# Patient Record
Sex: FEMALE | Race: BLACK OR AFRICAN AMERICAN | URBAN - METROPOLITAN AREA
[De-identification: names, ages, dates, MRNs, and addresses within clinical notes are randomized per-mention and may not be internally consistent; named-entity substitution may affect disease eponyms.]

---

## 2018-04-06 ENCOUNTER — OP HISTORICAL/CONVERTED ENCOUNTER (OUTPATIENT)
Dept: OTHER | Age: 83
End: 2018-04-06

## 2022-11-08 ENCOUNTER — APPOINTMENT (OUTPATIENT)
Dept: CT IMAGING | Age: 87
End: 2022-11-08
Attending: STUDENT IN AN ORGANIZED HEALTH CARE EDUCATION/TRAINING PROGRAM
Payer: MEDICARE

## 2022-11-08 ENCOUNTER — HOSPITAL ENCOUNTER (EMERGENCY)
Age: 87
Discharge: HOME | End: 2022-11-08
Attending: STUDENT IN AN ORGANIZED HEALTH CARE EDUCATION/TRAINING PROGRAM
Payer: MEDICARE

## 2022-11-08 VITALS
HEART RATE: 61 BPM | BODY MASS INDEX: 27.82 KG/M2 | OXYGEN SATURATION: 99 % | DIASTOLIC BLOOD PRESSURE: 65 MMHG | SYSTOLIC BLOOD PRESSURE: 147 MMHG | HEIGHT: 65 IN | TEMPERATURE: 98 F | WEIGHT: 167 LBS | RESPIRATION RATE: 16 BRPM

## 2022-11-08 DIAGNOSIS — S01.81XA FACIAL LACERATION, INITIAL ENCOUNTER: ICD-10-CM
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
## 2022-11-08 PROCEDURE — 74011250636 HC RX REV CODE- 250/636: Performed by: STUDENT IN AN ORGANIZED HEALTH CARE EDUCATION/TRAINING PROGRAM

## 2022-11-08 PROCEDURE — 70450 CT HEAD/BRAIN W/O DYE: CPT

## 2022-11-08 PROCEDURE — 75810000293 HC SIMP/SUPERF WND  RPR

## 2022-11-08 PROCEDURE — 99284 EMERGENCY DEPT VISIT MOD MDM: CPT

## 2022-11-08 PROCEDURE — 90714 TD VACC NO PRESV 7 YRS+ IM: CPT | Performed by: STUDENT IN AN ORGANIZED HEALTH CARE EDUCATION/TRAINING PROGRAM

## 2022-11-08 PROCEDURE — 70486 CT MAXILLOFACIAL W/O DYE: CPT

## 2022-11-08 RX ORDER — LIDOCAINE HYDROCHLORIDE 10 MG/ML
5 INJECTION INFILTRATION; PERINEURAL ONCE
Status: DISCONTINUED | OUTPATIENT
Start: 2022-11-08 | End: 2022-11-08 | Stop reason: HOSPADM

## 2022-11-08 RX ORDER — CEPHALEXIN 500 MG/1
500 CAPSULE ORAL 4 TIMES DAILY
Qty: 28 CAPSULE | Refills: 0 | Status: SHIPPED | OUTPATIENT
Start: 2022-11-08 | End: 2022-11-15

## 2022-11-08 RX ADMIN — TETANUS AND DIPHTHERIA TOXOIDS ADSORBED 0.5 ML: 2; 2 INJECTION INTRAMUSCULAR at 05:00

## 2022-11-08 NOTE — ED PROVIDER NOTES
[facility]  EMERGENCY DEPARTMENT ENCOUNTER NOTE        Date: 11/8/2022  Patient Name: John Bender      History of Presenting Illness     Chief Complaint   Patient presents with    Fall       History Provided By: Patient    HPI: John Bender, 80 y.o. female with PMH of DM, HTN, HLD, and unsteadiness who presents to the ED after having mechanical fall. Patient reports that she used the bathroom and as she is trying to transition to bed she had an episode of unsteadiness which is usual for her and fell and hit her head on the bed. This resulted in a laceration to the left lower side of the face. Unknown tetanus status. She does not had any loss of consciousness. No headache, changes to vision, hearing, speech, numbness, weakness or than usual.  She is denying preceding chest pain, shortness of breath, lightheadedness or dizziness. No nausea, vomiting, recent illnesses, or any changes in her bowel, dietary, urinary habits. No recent change in her medications. There are no other complaints, changes, or physical findings at this time. PCP: None        Past History     Past Medical History:  Diabetes  Hypertension  Hyperlipidemia  Unsteadiness    Past Surgical History:  No past surgical history on file. Family History:  No family history on file. Social History: Allergies:  No Known Allergies      Review of Systems     Review of Systems    A 10 point review of system was performed and was negative except as noted above in HPI    Physical Exam     Physical Exam  Vitals and nursing note reviewed. Constitutional:       General: She is not in acute distress. Appearance: She is well-developed. She is not diaphoretic. HENT:      Head: Normocephalic and atraumatic. Comments: Laceration to the left side of the lower face just right below the lip x2  Lac # 1 = 6 cm  Lac # 2 = 2 cm  Eyes:      Extraocular Movements: Extraocular movements intact. Conjunctiva/sclera: Conjunctivae normal.   Cardiovascular:      Rate and Rhythm: Normal rate and regular rhythm. Heart sounds: Normal heart sounds. Pulmonary:      Effort: Pulmonary effort is normal.      Breath sounds: Normal breath sounds. Abdominal:      Palpations: Abdomen is soft. Tenderness: There is no abdominal tenderness. Musculoskeletal:      Cervical back: Neck supple. No tenderness. Right lower leg: No tenderness. No edema. Left lower leg: No tenderness. No edema. Lymphadenopathy:      Cervical: No cervical adenopathy. Neurological:      Mental Status: She is alert and oriented to person, place, and time. Cranial Nerves: No cranial nerve deficit. Sensory: No sensory deficit. Motor: No weakness. Lab and Diagnostic Study Results     Labs -   No results found for this or any previous visit (from the past 12 hour(s)). Radiologic Studies -   [unfilled]  CT Results  (Last 48 hours)                 11/08/22 0442  CT HEAD WO CONT Final result    Impression:  No acute intracranial abnormality               Narrative:  EXAM: CT HEAD WO CONT       INDICATION: Fall       COMPARISON: None. CONTRAST: None. TECHNIQUE: Unenhanced CT of the head was performed using 5 mm images. Brain and   bone windows were generated. Coronal and sagittal reformats. CT dose reduction   was achieved through use of a standardized protocol tailored for this   examination and automatic exposure control for dose modulation. FINDINGS:   The ventricles and sulci are normal in size, shape and configuration. . Scattered   subcortical and deep white matter hypodensities. . There is no intracranial   hemorrhage, extra-axial collection, or mass effect. The basilar cisterns are   open. No CT evidence of acute infarct. The bone windows demonstrate no abnormalities. The visualized portions of the   paranasal sinuses and mastoid air cells are clear.            11/08/22 6140 CT MAXILLOFACIAL WO CONT Final result    Impression:  No fracture. Laceration along the anterior aspect of the left mandible small   foreign bodies in place       Narrative:  EXAM: CT MAXILLOFACIAL WO CONT       INDICATION: Fall       COMPARISON: None. CONTRAST:   None. TECHNIQUE:  Multislice helical CT of the facial bones was performed in the axial   plane without intravenous contrast administration. Coronal and sagittal   reformations were generated. CT dose reduction was achieved through use of a   standardized protocol tailored for this examination and automatic exposure   control for dose modulation. FINDINGS:       Bones: There is no fracture or other osseous abnormality       Paranasal sinuses: Clear       Orbits: The globes, optic nerves, and extraocular muscles are within normal   limits. Base of brain and soft tissues: Laceration along the anterior aspect of the left   mandible with small foreign bodies. Miscellaneous: N/A                  CXR Results  (Last 48 hours)      None            Medical Decision Making and ED Course   - I am the first and primary provider for this patient AND AM THE PRIMARY PROVIDER OF RECORD. - I reviewed the vital signs, available nursing notes, past medical history, past surgical history, family history and social history. - Initial assessment performed. The patients presenting problems have been discussed, and the staff are in agreement with the care plan formulated and outlined with them. I have encouraged them to ask questions as they arise throughout their visit. Vital Signs-Reviewed the patient's vital signs.     Patient Vitals for the past 24 hrs:   Temp Pulse Resp BP SpO2   11/08/22 0615 -- -- -- (!) 147/65 --   11/08/22 0529 -- -- -- (!) 151/61 99 %   11/08/22 0447 98 °F (36.7 °C) 61 16 (!) 148/52 99 %       Records Reviewed: Nursing Notes    Provider Notes (Medical Decision Making):         ED Course as of 11/08/22 0702   Tue Nov 08, 2022   0420 Patient is a pleasant 80-year-old female with past medical history as above including recurrent chronic vertigo with unsteadiness when she is walking who had a mechanical fall resulting in laceration to the face. She denies being on blood thinners or loss of consciousness. Her evaluation did not reveal any tenderness or trauma to her neck, back, evidence of trauma to the chest, abdomen or musculoskeletal system. Tetanus status is unknown. We will update her tetanus status and get CT head and face. Clearly this is triggered by a mechanical fall and does not have any preceding chest pain, shortness of breath or unsteadiness that is different from her baseline. [AA]   [id number] CT of the head and CT of maxillofacial are negative for acute finding. Will suture laceration discharge follow-up as an outpatient. [AA]   [phone] [id number] ED evaluation, work-up, clinical impression, and disposition was discussed with the patient. Patient is agreeable. Patient verbalized understanding and will be able to arrange follow-up. Anticipatory guidance and return precautions discussed with the patient. At the time of discharge, all concerns have been addressed and patient had no further questions. Patient is hemodynamically stable and appropriate for discharge. [AA]      ED Course User Index  [AA] Rafat Casas MD         Procedures and Critical Care       Performed by: Kulwant Valenzuela MD  PROCEDURES:  Wound Repair    Date/Time: 11/8/2022 7:02 AM  Performed by: attendingPre-procedure re-eval: Immediately prior to the procedure, the patient was reevaluated and found suitable for the planned procedure and any planned medications. Time out: Immediately prior to the procedure a time out was called to verify the correct patient, procedure, equipment, staff and marking as appropriate. .  Location details: face  Wound length: 3 small lacerations all less than 2.5 cm.   Anesthesia: local infiltration    Anesthesia:  Local Anesthetic: lidocaine 1% without epinephrine  Anesthetic total: 3 mL  Foreign bodies: no foreign bodies  Irrigation solution: saline  Irrigation method: jet lavage  Debridement: none  Skin closure: Prolene  Number of sutures: 10  Technique: simple  Approximation: close  Dressing: antibiotic ointment  Patient tolerance: patient tolerated the procedure well with no immediate complications  My total time at bedside, performing this procedure was 1-15 minutes. Wound Repair    Date/Time: 11/8/2022 7:04 AM  Performed by: attendingPre-procedure re-eval: Immediately prior to the procedure, the patient was reevaluated and found suitable for the planned procedure and any planned medications. Time out: Immediately prior to the procedure a time out was called to verify the correct patient, procedure, equipment, staff and marking as appropriate. .  Location details: lip  Wound length:2.5 cm or less    Anesthesia:  Local Anesthetic: lidocaine 1% without epinephrine  Anesthetic total: 1 mL  Foreign bodies: no foreign bodies  Irrigation solution: saline  Irrigation method: jet lavage  Skin closure: Vicryl  Number of sutures: 3  Technique: simple  Approximation: close  Patient tolerance: patient tolerated the procedure well with no immediate complications  My total time at bedside, performing this procedure was 1-15 minutes. Diagnosis     Clinical Impression:   1. Fall, initial encounter    2. Facial laceration, initial encounter          Disposition     Disposition: Condition improved  DC- Adult Discharges: All of the diagnostic tests were reviewed and questions answered. Diagnosis, care plan and treatment options were discussed. The patient understands the instructions and will follow up as directed. The patients results have been reviewed with them. They have been counseled regarding their diagnosis.   The patient verbally convey understanding and agreement of the signs, symptoms, diagnosis, treatment and prognosis and additionally agrees to follow up as recommended with their PCP in 24 - 48 hours. They also agree with the care-plan and convey that all of their questions have been answered. I have also put together some discharge instructions for them that include: 1) educational information regarding their diagnosis, 2) how to care for their diagnosis at home, as well a 3) list of reasons why they would want to return to the ED prior to their follow-up appointment, should their condition change. Discharged      DISCHARGE PLAN:  1. Follow-up Information       Follow up With Specialties Details Why [facility] EMERGENCY DEPT Emergency Medicine Go to  As needed, If symptoms worsen [id number] [id number]  [phone]    Primary Care Doctor  Schedule an appointment as soon as possible for a visit in 2 days For reevaluation, Discuss your visit to the ER           2. Return to ED if worse   3. Discharge Medication List as of 11/8/2022  6:31 AM        START taking these medications    Details   cephALEXin (Keflex) 500 mg capsule Take 1 Capsule by mouth four (4) times daily for 7 days. , Normal, Disp-28 Capsule, R-0               Attestations: Wendy Vogel MD    Please note that this dictation was completed with [employer], the computer voice recognition software. Quite often unanticipated grammatical, syntax, homophones, and other interpretive errors are inadvertently transcribed by the computer software. Please disregard these errors. Please excuse any errors that have escaped final proofreading. Thank you.

## 2022-11-08 NOTE — DISCHARGE INSTRUCTIONS
Thank you! Thank you for allowing me to care for you in the emergency department. It is my goal to provide you with excellent care. If you have not received excellent quality care, please ask to speak to the nurse manager. Please fill out the survey that will come to you by mail or email since we listen to your feedback! Below you will find a list of your tests from today's visit. Should you have any questions, please do not hesitate to call the emergency department. Labs  No results found for this or any previous visit (from the past 12 hour(s)). Radiologic Studies  CT HEAD WO CONT   Final Result   No acute intracranial abnormality            CT MAXILLOFACIAL WO CONT   Final Result   No fracture. Laceration along the anterior aspect of the left mandible small   foreign bodies in place        CT Results  (Last 48 hours)                 11/08/22 0442  CT HEAD WO CONT Final result    Impression:  No acute intracranial abnormality               Narrative:  EXAM: CT HEAD WO CONT       INDICATION: Fall       COMPARISON: None. CONTRAST: None. TECHNIQUE: Unenhanced CT of the head was performed using 5 mm images. Brain and   bone windows were generated. Coronal and sagittal reformats. CT dose reduction   was achieved through use of a standardized protocol tailored for this   examination and automatic exposure control for dose modulation. FINDINGS:   The ventricles and sulci are normal in size, shape and configuration. . Scattered   subcortical and deep white matter hypodensities. . There is no intracranial   hemorrhage, extra-axial collection, or mass effect. The basilar cisterns are   open. No CT evidence of acute infarct. The bone windows demonstrate no abnormalities. The visualized portions of the   paranasal sinuses and mastoid air cells are clear. 11/08/22 0442  CT MAXILLOFACIAL WO CONT Final result    Impression:  No fracture.  Laceration along the anterior aspect of the left mandible small   foreign bodies in place       Narrative:  EXAM: CT MAXILLOFACIAL WO CONT       INDICATION: Fall       COMPARISON: None. CONTRAST:   None. TECHNIQUE:  Multislice helical CT of the facial bones was performed in the axial   plane without intravenous contrast administration. Coronal and sagittal   reformations were generated. CT dose reduction was achieved through use of a   standardized protocol tailored for this examination and automatic exposure   control for dose modulation. FINDINGS:       Bones: There is no fracture or other osseous abnormality       Paranasal sinuses: Clear       Orbits: The globes, optic nerves, and extraocular muscles are within normal   limits. Base of brain and soft tissues: Laceration along the anterior aspect of the left   mandible with small foreign bodies. Miscellaneous: N/A                  CXR Results  (Last 48 hours)      None          ------------------------------------------------------------------------------------------------------------  The exam and treatment you received in the Emergency Department were for an urgent problem and are not intended as complete care. It is important that you follow-up with a doctor, nurse practitioner, or physician assistant to:  (1) confirm your diagnosis,  (2) re-evaluation of changes in your illness and treatment, and  (3) for ongoing care. Please take your discharge instructions with you when you go to your follow-up appointment. Your stitches need to come out in 4 to 5 days. You can do that either through your primary care doctor, coming back here, or going to an urgent care. You need your stitches to be examined in 2 days to make sure that you do not have any signs of infection. Can do that either by coming back to the ER, following up with your doctor or going to an urgent care. If you have any problem arranging a follow-up appointment, contact the Emergency Department.   If your symptoms become worse or you do not improve as expected and you are unable to reach your health care provider, please return to the Emergency Department. We are available 24 hours a day. If a prescription has been provided, please have it filled as soon as possible to prevent a delay in treatment. If you have any questions or reservations about taking the medication due to side effects or interactions with other medications, please call your primary care provider or contact the ER.

## 2022-11-08 NOTE — ED TRIAGE NOTES
Pt went to the bathroom felt dizzy when getting into bed and hit her chin at the edge of the bed.  Pt uses a cane to walk states that she has been loosing balance lately